# Patient Record
Sex: MALE | Race: WHITE | NOT HISPANIC OR LATINO | Employment: STUDENT | URBAN - METROPOLITAN AREA
[De-identification: names, ages, dates, MRNs, and addresses within clinical notes are randomized per-mention and may not be internally consistent; named-entity substitution may affect disease eponyms.]

---
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
## 2020-08-15 PROBLEM — Z86.010 HX OF COLONIC POLYP: Status: ACTIVE | Noted: 2020-08-15

## 2020-08-15 PROBLEM — K57.90 DIVERTICULOSIS: Status: ACTIVE | Noted: 2020-08-15

## 2020-08-15 PROBLEM — K64.8 INTERNAL HEMORRHOIDS: Status: ACTIVE | Noted: 2020-08-15

## 2023-10-04 ENCOUNTER — OFFICE VISIT (OUTPATIENT)
Dept: URGENT CARE | Facility: CLINIC | Age: 23
End: 2023-10-04
Payer: OTHER MISCELLANEOUS

## 2023-10-04 VITALS
OXYGEN SATURATION: 99 % | TEMPERATURE: 97 F | DIASTOLIC BLOOD PRESSURE: 66 MMHG | SYSTOLIC BLOOD PRESSURE: 123 MMHG | BODY MASS INDEX: 18.35 KG/M2 | RESPIRATION RATE: 16 BRPM | HEIGHT: 74 IN | WEIGHT: 143 LBS | HEART RATE: 91 BPM

## 2023-10-04 DIAGNOSIS — S80.02XA CONTUSION OF LEFT KNEE, INITIAL ENCOUNTER: ICD-10-CM

## 2023-10-04 DIAGNOSIS — Z02.6 ENCOUNTER RELATED TO WORKER'S COMPENSATION CLAIM: Primary | ICD-10-CM

## 2023-10-04 PROCEDURE — 99203 OFFICE O/P NEW LOW 30 MIN: CPT | Mod: S$GLB,,, | Performed by: FAMILY MEDICINE

## 2023-10-04 PROCEDURE — 73562 X-RAY EXAM OF KNEE 3: CPT | Mod: LT,S$GLB,, | Performed by: RADIOLOGY

## 2023-10-04 PROCEDURE — 99203 PR OFFICE/OUTPT VISIT, NEW, LEVL III, 30-44 MIN: ICD-10-PCS | Mod: S$GLB,,, | Performed by: FAMILY MEDICINE

## 2023-10-04 PROCEDURE — 73562 XR KNEE 3 VIEW LEFT: ICD-10-PCS | Mod: LT,S$GLB,, | Performed by: RADIOLOGY

## 2023-10-04 NOTE — LETTER
Urgent Care - [place] [address], [address]  [facility] [address]  Phone: [phone]  Fax: [phone]  [facility]: [phone]    Pt Name: Kareem Nick  Injury Date: 10/04/2023   Employee ID: [id number] Date of First Treatment: 10/04/2023   Company: [employer] to record [employer]       Appointment Time: 03:10 PM Arrived: 330   Provider: Sherman Ramirez MD Time Out:415     Office Treatment:   1. Encounter related to worker's compensation claim    2. Contusion of left knee, initial encounter          Patient Instructions: Daily home exercises/warm soaks, Attention not to aggravate affected area    Restrictions:  (work as tolerated with attention not to aggravate.)     Return Appointment: 10/12 or sooner if needed     You have a work related injury. Medical care and treatment required as a result of a work-related injury  should be focused on restoring functional ability required to meet your daily and work activities and return to work, while striving to restore your health to pre-injury status in so far as is feasible.  If Rx a medication that can be sedating, DO NOT TAKE DURING YOUR WORK DAY.    Some OTC measures to help in recovery(if no allergies to, renal issues or pregnant):  Tylenol 325mg 3x per day  Ibuprofen 400mg 3x per day OR Aleve regular strength one tablet 2x per day  Take Pepcid 20mg BID  If applicable or discussed: Magnesium OTC daily; Topical Voltaren Gel; Lidocaine patches  Massage area if possible  Resting of the injured area  Ice for ankle, wrist or elbow injury  Elevation of the injured area if applicable  Heating pad for muscle injury  Stretching/ROM exercises as described in clinic.   ** BE ADVISED: You should be in regular contact with your W/C  to know the status of your claim and /or (if any)pending referrals**

## 2023-10-04 NOTE — PROGRESS NOTES
Subjective:      Patient ID: Kareem Nick is a 22 y.o. male.    Chief Complaint: Knee Pain    Pt presents to urgent care for a w/c initial visit.  He works for [employer].  He states that he went to get up and hit his left knee on a metal railing.  Pain scale- 5..  A lot more pain when he bends or moves his left knee.  He has not taken ibuprofen or tylenol for the pain.     Knee Pain   The incident occurred 6 to 12 hours ago. The incident occurred at work. There was no injury mechanism. The pain is present in the left knee. The pain is at a severity of 5/10. The pain has been Intermittent since onset. He reports no foreign bodies present. Nothing aggravates the symptoms. He has tried nothing for the symptoms. The treatment provided no relief.     ROS  Objective:     Physical Exam  Musculoskeletal:         General: Swelling and tenderness present. Normal range of motion.        Legs:       Comments: FROM with flexion and extension of knee          Assessment:      1. Encounter related to worker's compensation claim    2. Contusion of left knee, initial encounter      Plan:          Patient Instructions: Daily home exercises/warm soaks, Attention not to aggravate affected area   Restrictions:  (work as tolerated with attention not to aggravate.)  No follow-ups on file.